# Patient Record
Sex: FEMALE | Race: BLACK OR AFRICAN AMERICAN | NOT HISPANIC OR LATINO | URBAN - METROPOLITAN AREA
[De-identification: names, ages, dates, MRNs, and addresses within clinical notes are randomized per-mention and may not be internally consistent; named-entity substitution may affect disease eponyms.]

---

## 2022-12-05 ENCOUNTER — OFFICE VISIT (OUTPATIENT)
Dept: FAMILY MEDICINE | Facility: CLINIC | Age: 32
End: 2022-12-05
Payer: COMMERCIAL

## 2022-12-05 VITALS
TEMPERATURE: 98.4 F | SYSTOLIC BLOOD PRESSURE: 132 MMHG | HEART RATE: 70 BPM | DIASTOLIC BLOOD PRESSURE: 86 MMHG | RESPIRATION RATE: 18 BRPM | OXYGEN SATURATION: 99 % | WEIGHT: 264.8 LBS

## 2022-12-05 DIAGNOSIS — J02.9 SORE THROAT: ICD-10-CM

## 2022-12-05 DIAGNOSIS — J02.0 STREPTOCOCCAL PHARYNGITIS: Primary | ICD-10-CM

## 2022-12-05 LAB — DEPRECATED S PYO AG THROAT QL EIA: POSITIVE

## 2022-12-05 PROCEDURE — 87880 STREP A ASSAY W/OPTIC: CPT | Performed by: NURSE PRACTITIONER

## 2022-12-05 PROCEDURE — U0005 INFEC AGEN DETEC AMPLI PROBE: HCPCS | Performed by: NURSE PRACTITIONER

## 2022-12-05 PROCEDURE — 99203 OFFICE O/P NEW LOW 30 MIN: CPT | Mod: CS | Performed by: NURSE PRACTITIONER

## 2022-12-05 PROCEDURE — U0003 INFECTIOUS AGENT DETECTION BY NUCLEIC ACID (DNA OR RNA); SEVERE ACUTE RESPIRATORY SYNDROME CORONAVIRUS 2 (SARS-COV-2) (CORONAVIRUS DISEASE [COVID-19]), AMPLIFIED PROBE TECHNIQUE, MAKING USE OF HIGH THROUGHPUT TECHNOLOGIES AS DESCRIBED BY CMS-2020-01-R: HCPCS | Performed by: NURSE PRACTITIONER

## 2022-12-05 RX ORDER — AMOXICILLIN 500 MG/1
1000 CAPSULE ORAL DAILY
Qty: 20 CAPSULE | Refills: 0 | Status: SHIPPED | OUTPATIENT
Start: 2022-12-05 | End: 2022-12-15

## 2022-12-05 NOTE — PROGRESS NOTES
Assessment & Plan     Sore throat    - Streptococcus A Rapid Screen w/Reflex to PCR - Clinic Collect  - Symptomatic; Yes; 11/28/2022 COVID-19 Virus (Coronavirus) by PCR Nose    Streptococcal pharyngitis    - amoxicillin (AMOXIL) 500 MG capsule  Dispense: 20 capsule; Refill: 0      Strep is positive today.  Discussed throwing away toothbrush after 24 hours.  No in-person work/school for at least 24 hours following start of treatment.  Push fluids.  Ibuprofen or Tylenol for pain as directed on package.  Return to clinic if not feeling much better in 2 or 3 days or new symptoms develop.  Consider covid testing if not better after 2-3 days.                 Return in about 3 days (around 12/8/2022) for If no better.    Melisa Murrieta [facility]    Julia Douglass is a 32 year old female who presents to clinic today for the following health issues:  Chief Complaint   Patient presents with     Pharyngitis     Pt states started 1 week sore throat and left ache      HPI    Sore throat without fever for 1 week.  No cough or congestion.  No exposures known.  Painful swallowing.  Swollen.          Review of Systems  See HPI       Objective    /86 (BP Location: Right arm, Patient Position: Sitting, Cuff Size: Adult Regular)   Pulse 70   Temp 98.4  F (36.9  C) (Oral)   Resp 18   Wt 120.1 kg (264 lb 12.8 oz)   LMP 12/01/2022 (Exact Date)   SpO2 99%   Physical Exam  Constitutional:       General: She is not in acute distress.     Appearance: She is well-developed and well-nourished.   HENT:      Mouth/Throat:      Pharynx: Uvula midline. Pharyngeal swelling and posterior oropharyngeal erythema present.      Tonsils: Tonsillar exudate (Slight right-sided) present. No tonsillar abscesses. 3+ on the right. 3+ on the left.   Eyes:      General:         Right eye: No discharge.         Left eye: No discharge.      Conjunctiva/sclera: Conjunctivae normal.   Cardiovascular:      Pulses:  Intact distal pulses.   Pulmonary:      Effort: Pulmonary effort is normal.   Musculoskeletal:         General: Normal range of motion.   Skin:     General: Skin is warm and dry.      Capillary Refill: Capillary refill takes less than 2 seconds.   Neurological:      Mental Status: She is alert and oriented to person, place, and time.   Psychiatric:         Mood and Affect: Mood and affect normal.         Behavior: Behavior normal.         Thought Content: Thought content normal.         Judgment: Judgment normal.            Results for orders placed or performed in visit on 12/05/22 (from the past 24 hour(s))   Streptococcus A Rapid Screen w/Reflex to PCR - Clinic Collect    Specimen: Throat; Swab   Result Value Ref Range    Group A Strep antigen Positive (A) Negative          No

## 2022-12-05 NOTE — LETTER
[address]  [facility] [address]  Phone: [phone]  Fax: [phone]    December 5, 2022        Rafat Cruz  [address]  [facility] [address]          To whom it may concern:    RE: Rafat Cruz    Patient was seen and treated today at our clinic and missed work.  Please excuse from work for contagious condition today and tomorrow.     Please contact me for questions or concerns.      Sincerely,        Melisa Murrieta, CNP

## 2022-12-05 NOTE — PATIENT INSTRUCTIONS
You have strep.    Throw away toothbrush after 24 hours.  No work/school for at least 24 hours following start of treatment or for 24 hours after temperature less than 100.4 F.  Push fluids.  Ibuprofen or Tylenol for pain as directed on package.  Return to clinic if not feeling much better in 2 or 3 days or new symptoms develop.

## 2022-12-06 LAB — SARS-COV-2 RNA RESP QL NAA+PROBE: NEGATIVE

## 2023-02-12 ENCOUNTER — HEALTH MAINTENANCE LETTER (OUTPATIENT)
Age: 33
End: 2023-02-12

## 2023-04-03 ENCOUNTER — OFFICE VISIT (OUTPATIENT)
Dept: FAMILY MEDICINE | Facility: CLINIC | Age: 33
End: 2023-04-03
Payer: COMMERCIAL

## 2023-04-03 VITALS
DIASTOLIC BLOOD PRESSURE: 80 MMHG | RESPIRATION RATE: 17 BRPM | WEIGHT: 273 LBS | OXYGEN SATURATION: 100 % | SYSTOLIC BLOOD PRESSURE: 126 MMHG | HEART RATE: 75 BPM | TEMPERATURE: 98.1 F

## 2023-04-03 DIAGNOSIS — R39.9 URINARY TRACT INFECTION SYMPTOMS: ICD-10-CM

## 2023-04-03 DIAGNOSIS — Z20.2 SEXUALLY TRANSMITTED DISEASE EXPOSURE: Primary | ICD-10-CM

## 2023-04-03 LAB
ALBUMIN UR-MCNC: NEGATIVE MG/DL
APPEARANCE UR: CLEAR
BILIRUB UR QL STRIP: NEGATIVE
COLOR UR AUTO: YELLOW
GLUCOSE UR STRIP-MCNC: NEGATIVE MG/DL
HGB UR QL STRIP: NEGATIVE
KETONES UR STRIP-MCNC: NEGATIVE MG/DL
LEUKOCYTE ESTERASE UR QL STRIP: NEGATIVE
NITRATE UR QL: NEGATIVE
PH UR STRIP: 5.5 [PH] (ref 5–8)
SP GR UR STRIP: >=1.03 (ref 1–1.03)
UROBILINOGEN UR STRIP-ACNC: 0.2 E.U./DL

## 2023-04-03 PROCEDURE — 99214 OFFICE O/P EST MOD 30 MIN: CPT | Performed by: FAMILY MEDICINE

## 2023-04-03 PROCEDURE — 81003 URINALYSIS AUTO W/O SCOPE: CPT | Performed by: FAMILY MEDICINE

## 2023-04-03 PROCEDURE — 87086 URINE CULTURE/COLONY COUNT: CPT | Performed by: FAMILY MEDICINE

## 2023-04-03 PROCEDURE — 87563 M. GENITALIUM AMP PROBE: CPT | Mod: 90 | Performed by: FAMILY MEDICINE

## 2023-04-03 PROCEDURE — 99000 SPECIMEN HANDLING OFFICE-LAB: CPT | Performed by: FAMILY MEDICINE

## 2023-04-03 PROCEDURE — 87798 DETECT AGENT NOS DNA AMP: CPT | Mod: 90 | Performed by: FAMILY MEDICINE

## 2023-04-03 NOTE — PROGRESS NOTES
ASSESSMENT/PLAN:      ICD-10-CM    1. Urinary tract infection symptoms  R39.9 UA macro with reflex to Microscopic and Culture - Clinc Collect     Urogenital Ureaplasma and Mycoplasma Species by PCR     Urine Culture Aerobic Bacterial - lab collect     Urine Culture Aerobic Bacterial - lab collect     Urogenital Ureaplasma and Mycoplasma Species by PCR      2. Sexually transmitted disease exposure  Z20.2     Exposure to Ureaplasma        Patient is a 32-year-old female with recent report from partner of exposure to Ureaplasma.  Patient history of frequency and urgency in the recent past, mild lower abdominal pain that she attributes to early menstrual past prior to her pending menses  UA was negative , urine sent for culture due to history of exposure to ureaplasma   In addition urine Ureaplasma PCR was completed as well.  If patient is positive for Ureaplasma she will need to be prescribed doxycycline 100 mg  twice daily x14 days      Reviewed medication instructions and side effects. Follow up if experiences side effects.     I reviewed supportive care, otc meds to use if needed, expected course, and signs of concern.  Follow up as needed or if she does not improve within  1-2 days or if worsens in any way.  Reviewed red flag symptoms and is to go to the ER if experiences any of these.     The use of Dragon[employer] dictation services may have been used to construct the content in this note; any grammatical or spelling errors are non-intentional. Please contact the author of this note directly if you are in need of any clarification.      On the day of the encounter, time spend on chart review, patient visit, review of testing, documentation was 30 minutes          Patient Instructions     Will contact you with results and if positive, will call in medication    If abdominal pain becomes severe, fever to ER              Patient presents with:  Urinary Problem: Was exposed to ureaplasma, concerned she might have  it, not showing any symptoms; but would like to see whats going on better.        Subjective     Rafat Cruz is a 32 year old female who presents to clinic today for the following health issues:    HPI   }  URINARY TRACT SYMPTOMS      Duration: informed partner has ureaplasma 2 days ago,     Last with partner 2/14/2023 -he was tested, only positive for ureaplasma    Negative for GC and chlamydia     Description  frequency and urgency-2/2023-prior to intercourse with partner, symptoms have resolved    Intensity:  No symptoms    Accompanying signs and symptoms:  Fever/chills: no   Flank pain no   Nausea and vomiting: no   Vaginal symptoms: none  Abdominal/Pelvic Pain: YES- typical prior to menses  3/7/2023 lmp, anticipate starting menses this week     History  History of frequent UTI's: no   History of kidney stones: no   Sexually Active: YES- one partner-no other partners since that time  Possibility of pregnancy: No     Precipitating or alleviating factors: NA    Therapies tried and outcome: none   Outcome: NA       No past medical history on file.  Social History     Tobacco Use     Smoking status: Never     Smokeless tobacco: Never   Vaping Use     Vaping status: Not on file   Substance Use Topics     Alcohol use: Not on file       No current outpatient medications on file.     No Known Allergies          ROS are negative, except as otherwise noted HPI      Objective    /80 (BP Location: Right arm, Patient Position: Sitting, Cuff Size: Adult Regular)   Pulse 75   Temp 98.1  F (36.7  C) (Oral)   Resp 17   Wt 123.8 kg (273 lb)   LMP 03/06/2023 (Approximate)   SpO2 100%   There is no height or weight on file to calculate BMI.  Physical Exam   GENERAL: healthy, alert and no distress  ABDOMEN: soft, mild lower abdominal tenderness no hepatosplenomegaly, no masses and bowel sounds normal, no CVA tenderness  MS: no gross musculoskeletal defects noted, no edema  NEURO: Normal strength and tone, mentation  intact and speech normal      Diagnostic Test Results:  Labs reviewed in Epic  Results for orders placed or performed in visit on 04/03/23   UA macro with reflex to Microscopic and Culture - Clinc Collect     Status: Normal    Specimen: Urine, Clean Catch   Result Value Ref Range    Color Urine Yellow Colorless, Straw, Light Yellow, Yellow    Appearance Urine Clear Clear    Glucose Urine Negative Negative mg/dL    Bilirubin Urine Negative Negative    Ketones Urine Negative Negative mg/dL    Specific Gravity Urine >=1.030 1.005 - 1.030    Blood Urine Negative Negative    pH Urine 5.5 5.0 - 8.0    Protein Albumin Urine Negative Negative mg/dL    Urobilinogen Urine 0.2 0.2, 1.0 E.U./dL    Nitrite Urine Negative Negative    Leukocyte Esterase Urine Negative Negative    Narrative    Microscopic not indicated

## 2023-04-03 NOTE — PATIENT INSTRUCTIONS
Will contact you with results and if positive, will call in medication    If abdominal pain becomes severe, fever to ER

## 2023-04-05 LAB — BACTERIA UR CULT: NORMAL

## 2023-04-07 DIAGNOSIS — N34.1 NGU DUE TO UREAPLASMA UREALYTICUM: Primary | ICD-10-CM

## 2023-04-07 DIAGNOSIS — A49.3 NGU DUE TO UREAPLASMA UREALYTICUM: Primary | ICD-10-CM

## 2023-04-07 LAB
M GENITALIUM DNA SPEC QL NAA+PROBE: NOT DETECTED
M HOMINIS DNA SPEC QL NAA+PROBE: NOT DETECTED
U PARVUM DNA SPEC QL NAA+PROBE: DETECTED
U UREALYTICUM DNA SPEC QL NAA+PROBE: NOT DETECTED

## 2023-04-07 RX ORDER — DOXYCYCLINE 100 MG/1
100 TABLET ORAL 2 TIMES DAILY
Qty: 28 TABLET | Refills: 0 | Status: SHIPPED | OUTPATIENT
Start: 2023-04-07 | End: 2023-04-21

## 2023-04-08 NOTE — RESULT ENCOUNTER NOTE
Team - please call patient with results.  Positive for ureaplasma parvum, will treat with doxycycline.  Partner has been treated I believe.  They should both finish antibiotics prior and be asymptomatic before return to sexual activity.

## 2023-04-09 ENCOUNTER — TELEPHONE (OUTPATIENT)
Dept: FAMILY MEDICINE | Facility: CLINIC | Age: 33
End: 2023-04-09
Payer: COMMERCIAL

## 2023-04-09 NOTE — TELEPHONE ENCOUNTER
Called patient, no answer. Left voicemail providing call back number. Please reach out to patient again if no call back return.    Magdiel Reed MA on 4/9/2023 at 9:31 AM

## 2023-04-09 NOTE — TELEPHONE ENCOUNTER
----- Message from Rachel Vernon PA-C sent at 4/7/2023  8:45 PM CDT -----  Team - please call patient with results.  Positive for ureaplasma parvum, will treat with doxycycline.  Partner has been treated I believe.  They should both finish antibiotics prior and be asymptomatic before return to sexual activity.

## 2023-04-10 NOTE — TELEPHONE ENCOUNTER
"Called patient and relayed message. Patient understood and states if need to also test daughter because they \"shared a towel\". Medical Assistant relayed patient's concern to provider and provider states it is not needed to bring in daughter. No further questions.     Magdiel Reed MA on 4/10/2023 at 11:16 AM  "

## 2024-03-10 ENCOUNTER — HEALTH MAINTENANCE LETTER (OUTPATIENT)
Age: 34
End: 2024-03-10

## 2025-03-16 ENCOUNTER — HEALTH MAINTENANCE LETTER (OUTPATIENT)
Age: 35
End: 2025-03-16